# Patient Record
Sex: FEMALE | Race: WHITE | URBAN - METROPOLITAN AREA
[De-identification: names, ages, dates, MRNs, and addresses within clinical notes are randomized per-mention and may not be internally consistent; named-entity substitution may affect disease eponyms.]

---

## 2022-03-24 ENCOUNTER — NURSE ONLY (OUTPATIENT)
Dept: INTERNAL MEDICINE CLINIC | Facility: HOSPITAL | Age: 27
End: 2022-03-24
Attending: EMERGENCY MEDICINE

## 2022-03-24 DIAGNOSIS — Z00.00 WELLNESS EXAMINATION: Primary | ICD-10-CM

## 2022-03-24 PROCEDURE — 86480 TB TEST CELL IMMUN MEASURE: CPT

## 2022-03-25 LAB
M TB TUBERC IFN-G BLD QL: NEGATIVE
M TB TUBERC IGNF/MITOGEN IGNF CONTROL: >10 IU/ML
QFT TB1 AG MINUS NIL: 0.02 IU/ML
QFT TB2 AG MINUS NIL: 0 IU/ML

## 2022-06-16 ENCOUNTER — HOSPITAL ENCOUNTER (EMERGENCY)
Facility: HOSPITAL | Age: 27
Discharge: HOME | End: 2022-06-16
Payer: COMMERCIAL

## 2022-06-16 ENCOUNTER — TELEPHONE (OUTPATIENT)
Dept: INTERNAL MEDICINE CLINIC | Facility: HOSPITAL | Age: 27
End: 2022-06-16

## 2022-06-16 DIAGNOSIS — Z20.822 SUSPECTED 2019 NOVEL CORONAVIRUS INFECTION: Primary | ICD-10-CM

## 2022-06-16 LAB — SARS-COV-2 RNA RESP QL NAA+PROBE: NOT DETECTED

## 2022-08-04 ENCOUNTER — IMMUNIZATION (OUTPATIENT)
Dept: LAB | Age: 27
End: 2022-08-04
Payer: COMMERCIAL

## 2022-08-04 DIAGNOSIS — Z23 NEED FOR VACCINATION: Primary | ICD-10-CM

## 2022-08-04 PROCEDURE — 90471 IMMUNIZATION ADMIN: CPT

## 2022-09-01 ENCOUNTER — IMMUNIZATION (OUTPATIENT)
Dept: LAB | Age: 27
End: 2022-09-01
Attending: EMERGENCY MEDICINE
Payer: COMMERCIAL

## 2022-09-01 DIAGNOSIS — Z23 NEED FOR VACCINATION: Primary | ICD-10-CM

## 2022-09-01 PROCEDURE — 90471 IMMUNIZATION ADMIN: CPT

## 2022-09-29 ENCOUNTER — HOSPITAL ENCOUNTER (OUTPATIENT)
Dept: GENERAL RADIOLOGY | Facility: HOSPITAL | Age: 27
Discharge: HOME | End: 2022-09-29
Attending: EMERGENCY MEDICINE

## 2022-09-29 ENCOUNTER — OFFICE VISIT (OUTPATIENT)
Dept: OTHER | Facility: HOSPITAL | Age: 27
End: 2022-09-29
Attending: EMERGENCY MEDICINE

## 2022-09-29 DIAGNOSIS — R52 PAIN: Primary | ICD-10-CM

## 2022-09-29 DIAGNOSIS — R52 PAIN: ICD-10-CM

## 2022-09-29 PROCEDURE — 73130 X-RAY EXAM OF HAND: CPT | Performed by: EMERGENCY MEDICINE

## 2023-04-06 ENCOUNTER — HOSPITAL ENCOUNTER (EMERGENCY)
Facility: HOSPITAL | Age: 28
Discharge: HOME | End: 2023-04-06
Attending: EMERGENCY MEDICINE
Payer: COMMERCIAL

## 2023-04-06 ENCOUNTER — APPOINTMENT (OUTPATIENT)
Dept: CT IMAGING | Facility: HOSPITAL | Age: 28
End: 2023-04-06
Attending: EMERGENCY MEDICINE
Payer: COMMERCIAL

## 2023-04-06 VITALS
TEMPERATURE: 98 F | SYSTOLIC BLOOD PRESSURE: 115 MMHG | HEART RATE: 97 BPM | OXYGEN SATURATION: 97 % | DIASTOLIC BLOOD PRESSURE: 85 MMHG | RESPIRATION RATE: 18 BRPM

## 2023-04-06 DIAGNOSIS — R10.9 FLANK PAIN: Primary | ICD-10-CM

## 2023-04-06 LAB
ALBUMIN SERPL-MCNC: 3.6 G/DL (ref 3.4–5)
ALBUMIN/GLOB SERPL: 0.8 {RATIO} (ref 1–2)
ALP LIVER SERPL-CCNC: 73 U/L
ALT SERPL-CCNC: 22 U/L
ANION GAP SERPL CALC-SCNC: 4 MMOL/L (ref 0–18)
AST SERPL-CCNC: 15 U/L (ref 15–37)
B-HCG UR QL: NEGATIVE
BASOPHILS # BLD AUTO: 0.05 X10(3) UL (ref 0–0.2)
BASOPHILS NFR BLD AUTO: 0.5 %
BILIRUB SERPL-MCNC: 0.4 MG/DL (ref 0.1–2)
BILIRUB UR QL: NEGATIVE
BUN BLD-MCNC: 8 MG/DL (ref 7–18)
BUN/CREAT SERPL: 9.8 (ref 10–20)
CALCIUM BLD-MCNC: 9.3 MG/DL (ref 8.5–10.1)
CHLORIDE SERPL-SCNC: 106 MMOL/L (ref 98–112)
CLARITY UR: CLEAR
CO2 SERPL-SCNC: 27 MMOL/L (ref 21–32)
COLOR UR: YELLOW
CREAT BLD-MCNC: 0.82 MG/DL
DEPRECATED RDW RBC AUTO: 38.6 FL (ref 35.1–46.3)
EOSINOPHIL # BLD AUTO: 0.12 X10(3) UL (ref 0–0.7)
EOSINOPHIL NFR BLD AUTO: 1.3 %
ERYTHROCYTE [DISTWIDTH] IN BLOOD BY AUTOMATED COUNT: 12.1 % (ref 11–15)
GFR SERPLBLD BASED ON 1.73 SQ M-ARVRAT: 102 ML/MIN/1.73M2 (ref 60–?)
GLOBULIN PLAS-MCNC: 4.3 G/DL (ref 2.8–4.4)
GLUCOSE BLD-MCNC: 99 MG/DL (ref 70–99)
GLUCOSE UR-MCNC: NORMAL MG/DL
HCT VFR BLD AUTO: 42.5 %
HGB BLD-MCNC: 14.7 G/DL
HGB UR QL STRIP.AUTO: NEGATIVE
IMM GRANULOCYTES # BLD AUTO: 0.02 X10(3) UL (ref 0–1)
IMM GRANULOCYTES NFR BLD: 0.2 %
KETONES UR-MCNC: NEGATIVE MG/DL
LEUKOCYTE ESTERASE UR QL STRIP.AUTO: 250
LIPASE SERPL-CCNC: 39 U/L (ref 13–75)
LYMPHOCYTES # BLD AUTO: 3.5 X10(3) UL (ref 1–4)
LYMPHOCYTES NFR BLD AUTO: 37.4 %
MCH RBC QN AUTO: 29.8 PG (ref 26–34)
MCHC RBC AUTO-ENTMCNC: 34.6 G/DL (ref 31–37)
MCV RBC AUTO: 86.2 FL
MONOCYTES # BLD AUTO: 0.42 X10(3) UL (ref 0.1–1)
MONOCYTES NFR BLD AUTO: 4.5 %
NEUTROPHILS # BLD AUTO: 5.25 X10 (3) UL (ref 1.5–7.7)
NEUTROPHILS # BLD AUTO: 5.25 X10(3) UL (ref 1.5–7.7)
NEUTROPHILS NFR BLD AUTO: 56.1 %
NITRITE UR QL STRIP.AUTO: NEGATIVE
OSMOLALITY SERPL CALC.SUM OF ELEC: 282 MOSM/KG (ref 275–295)
PH UR: 5.5 [PH] (ref 5–8)
PLATELET # BLD AUTO: 307 10(3)UL (ref 150–450)
POTASSIUM SERPL-SCNC: 4 MMOL/L (ref 3.5–5.1)
PROT SERPL-MCNC: 7.9 G/DL (ref 6.4–8.2)
PROT UR-MCNC: 20 MG/DL
RBC # BLD AUTO: 4.93 X10(6)UL
SODIUM SERPL-SCNC: 137 MMOL/L (ref 136–145)
SP GR UR STRIP: 1.03 (ref 1–1.03)
UROBILINOGEN UR STRIP-ACNC: NORMAL
WBC # BLD AUTO: 9.4 X10(3) UL (ref 4–11)

## 2023-04-06 PROCEDURE — 99284 EMERGENCY DEPT VISIT MOD MDM: CPT

## 2023-04-06 PROCEDURE — 85025 COMPLETE CBC W/AUTO DIFF WBC: CPT | Performed by: EMERGENCY MEDICINE

## 2023-04-06 PROCEDURE — 96374 THER/PROPH/DIAG INJ IV PUSH: CPT

## 2023-04-06 PROCEDURE — 80053 COMPREHEN METABOLIC PANEL: CPT | Performed by: EMERGENCY MEDICINE

## 2023-04-06 PROCEDURE — 81025 URINE PREGNANCY TEST: CPT

## 2023-04-06 PROCEDURE — 85025 COMPLETE CBC W/AUTO DIFF WBC: CPT

## 2023-04-06 PROCEDURE — 80053 COMPREHEN METABOLIC PANEL: CPT

## 2023-04-06 PROCEDURE — 87086 URINE CULTURE/COLONY COUNT: CPT | Performed by: EMERGENCY MEDICINE

## 2023-04-06 PROCEDURE — 74176 CT ABD & PELVIS W/O CONTRAST: CPT | Performed by: EMERGENCY MEDICINE

## 2023-04-06 PROCEDURE — 81001 URINALYSIS AUTO W/SCOPE: CPT | Performed by: EMERGENCY MEDICINE

## 2023-04-06 PROCEDURE — 83690 ASSAY OF LIPASE: CPT | Performed by: EMERGENCY MEDICINE

## 2023-04-06 RX ORDER — KETOROLAC TROMETHAMINE 30 MG/ML
30 INJECTION, SOLUTION INTRAMUSCULAR; INTRAVENOUS ONCE
Status: COMPLETED | OUTPATIENT
Start: 2023-04-06 | End: 2023-04-06

## 2023-04-06 RX ORDER — DIAZEPAM 5 MG/1
TABLET ORAL EVERY 12 HOURS PRN
Qty: 10 TABLET | Refills: 0 | Status: SHIPPED | OUTPATIENT
Start: 2023-04-06 | End: 2023-04-13

## 2023-04-06 RX ORDER — CEPHALEXIN 500 MG/1
500 CAPSULE ORAL 2 TIMES DAILY
Qty: 10 CAPSULE | Refills: 0 | Status: SHIPPED | OUTPATIENT
Start: 2023-04-06 | End: 2023-04-11

## 2023-04-06 NOTE — ED INITIAL ASSESSMENT (HPI)
Patient presents to ED with right flank pain that started yesterday. Denies urinary symptoms. Hx IBS.  Reports taking tylenol at 9 and has a lido patch in place with no relief. + nausea

## 2023-04-06 NOTE — DISCHARGE INSTRUCTIONS
Take the antibiotic prescribed to you today as directed. As we discussed if your culture is negative you can likely stop taking this antibiotic. Take Tylenol and ibuprofen as needed. For severe pain you can take Valium as prescribed. This may make you sleepy. Do not drink alcohol or drive while taking Valium. Use ice or heat to help with pain. When not using ice or heat you can use lidocaine patches. See primary care if not proving in the next 3 days. Return to the ER if you develop worsening symptoms, fever, inability to tolerate fluids, or any emergent concerns.

## 2023-12-14 ENCOUNTER — TELEPHONE (OUTPATIENT)
Dept: INTERNAL MEDICINE CLINIC | Facility: HOSPITAL | Age: 28
End: 2023-12-14

## 2023-12-14 NOTE — TELEPHONE ENCOUNTER
Outside Covid Testing done    Results and RTW guidelines:    COVID RESULT reported:      Test type:    [] Rapid outside         [] PCR outside       [x] Home Test    Date of test: 12/14     Test location: home         [] Result viewed in Epic with verbal consent received from employee     [x] Results per Employee Covid Dashboard    [] Employee instructed to email copy of result to [email or web address]       [x] Discussed with employee     [] Unable to reach by phone. Sent via [employer]        [x] Positive    - Employee should quarantine at home for at least 5 days (day 1 is day after sx onset) , follow the CDC guidelines for cleaning and                              quarantining; see CDC.gov   -This employee may RTW on day 6 if asymptomatic or mildly symptomatic (with improving symptoms). Call Employee Health on day 5 if unable to return on                      day 6 after symptom onset.    -This employee needs to call Employee Health on day 5 after symptom onset. The employee needs to be cleared by Employee Health. - If Employee is still experiencing severe symptoms must make a RTW appt with Employee Health, Employee will not be cleared if:    1. Has consistent cough, shortness of breath or fatigue that restricts your physical activities    2. Is still feeling \"unwell\"    3. Within 15 days of hospitalization for COVID    4. Within 20 days of intubation for COVID    5. Still has a fever, vomiting or diarrhea   - Keep communication open with management about RTW and if symptoms worsen    - Monitor sx and temperature    - Employee should quarantine at home for at least 5 days from sx onset, follow the CDC guidelines for cleaning and quarantining; see CDC. gov                  - Notify PCP of result                 - Seek emergent care with worsening symptoms        Notes:     RTW PLAN:    [x]  If COVID positive results, off work minimum of 5 days from positive test or onset of symptoms (day 0)        On day 5, if asymptomatic or mildly symptomatic (with improving symptoms) may return to work day 6          On day 5, if symptomatic, call Employee Health for RTW screening        []  COVID positive result - call Employee Health on day 5 after symptom onset. The employee needs to be cleared by Employee Health to RTW. [] RTW immediately, continue to monitor for sx  [] RTW when sx improve; must be fever free for 24 hours w/o medications, Diarrhea/Vomiting free for 24 hours w/o medications  [] [facility] ordered; continue to monitor sx and call for new/worsening sx. Discuss RTW guidelines with manager                                                                                                                                                                        [] Rapid ordered to confirm home negative. [] May continue to work  [] Follow up with PCP  [] Home until further instruction from hotline with [facility] results  INSTRUCTIONS PROVIDED:  [x]  Plan as noted above  []  Length of time to obtain results  []  May return to work if views negative in My Chart and  remains fever, vomiting, and diarrhea free  []  May continue to work if remains asymptomatic   [x]  Quarantine instructions  [x]  Masking protocol  [x]  S/S of worsening infection/condition and importance of prompt medical re-evaluation including when to seek emergency care  [] If symptoms develop, stay home and call hotline for rapid test order    Estimated RTW date:  12/19  [x] Manager notified        [] [address] [facility]  []KRISHAN   [x] [address] [facility]  Manager : Cherylene Bloodgood [address] [facility] COVID-19 Vaccine?  Yes [x]    No []          If yes, date(s) received:  1/21/21; 2/11/21          Which vaccine:  Pfizer [x]     [employer] []    J&J []      SYMPTOMS (reported via dashboard):  [] asymptomatic  [x] symptomatic  [] GI symptoms only    Symptom onset date: 12/13    Fever   > 100F             Yes []      Cough                          Yes [x]      Shortness of breath  Yes [] Congestion                 Yes [x]      Runny nose                Yes [x]        Loss of Smell              Yes []        Loss of Taste             Yes []       Sore throat                 Yes [x]       Fatigue                        Yes []       Body Aches                Yes [x]        Chills                           Yes []        Headache                   Yes []             GI symptoms             Yes []     No [x]                     Nausea   []          Vomiting            []                                    Diarrhea  []          Upset stomach []      Employee has positive COVID Exposure? Yes []     No [x]    Date of exposure:     []  Coworker                       [] patient                        [] Family/friend    Employee has a history of Covid?   Yes [x]     No []   If Yes, when: 2021    When was the last shift you worked?: 12/11    Notes:

## 2023-12-28 ENCOUNTER — APPOINTMENT (OUTPATIENT)
Dept: GENERAL RADIOLOGY | Facility: HOSPITAL | Age: 28
End: 2023-12-28
Payer: COMMERCIAL

## 2023-12-28 ENCOUNTER — HOSPITAL ENCOUNTER (EMERGENCY)
Facility: HOSPITAL | Age: 28
Discharge: HOME | End: 2023-12-29
Attending: STUDENT IN AN ORGANIZED HEALTH CARE EDUCATION/TRAINING PROGRAM
Payer: COMMERCIAL

## 2023-12-28 VITALS
TEMPERATURE: 99 F | HEART RATE: 74 BPM | OXYGEN SATURATION: 97 % | DIASTOLIC BLOOD PRESSURE: 88 MMHG | SYSTOLIC BLOOD PRESSURE: 117 MMHG

## 2023-12-28 DIAGNOSIS — S90.31XA CONTUSION OF RIGHT FOOT, INITIAL ENCOUNTER: Primary | ICD-10-CM

## 2023-12-28 PROCEDURE — 73630 X-RAY EXAM OF FOOT: CPT | Performed by: STUDENT IN AN ORGANIZED HEALTH CARE EDUCATION/TRAINING PROGRAM

## 2023-12-28 PROCEDURE — 99283 EMERGENCY DEPT VISIT LOW MDM: CPT

## 2023-12-28 PROCEDURE — 99284 EMERGENCY DEPT VISIT MOD MDM: CPT

## 2023-12-29 NOTE — DISCHARGE INSTRUCTIONS
Thank you for seeking care at [facility]. As discussed, you should take Ibuprofen (also called Advil or Motrin) or Naproxen (also called Aleve) for your pain. If you are taking Ibuprofen, you can take 600 mg every 6 hours, or 800 mg every 8 hours. If you are taking Naproxen, you can take 500 mg every 12 hours. Do not take more than this amount as it can cause kidney problems or bleeding in your stomach. Do not take these medications at the same time as it can increase your risk for these side effects. If your pain is not controlled you can also take Acetaminophen (also called Tylenol) 1 gram every 6 hours. Do not take more than 4 grams over the course of a day from all medications you take. You can take this medication in alternation with Ibuprofen so that every 3 hours you are taking either 600 mg of Ibuprofen or 1 gram of Acetaminophen.

## (undated) NOTE — LETTER
Date & Time: 4/6/2023, 3:23 AM  Patient: Radha Whalen  Encounter Provider(s):    Joy Padron MD       To Whom It May Concern:    Arcelia Velez was seen and treated in our department on 4/6/2023. She should not return to work until 4/7/2023.     If you have any questions or concerns, please do not hesitate to call.        _____________________________  Physician/APC Signature